# Patient Record
Sex: FEMALE | Race: WHITE | NOT HISPANIC OR LATINO | URBAN - METROPOLITAN AREA
[De-identification: names, ages, dates, MRNs, and addresses within clinical notes are randomized per-mention and may not be internally consistent; named-entity substitution may affect disease eponyms.]

---

## 2022-02-10 ENCOUNTER — PRE-PROCEDURE SCREENING (OUTPATIENT)
Dept: GASTROENTEROLOGY | Facility: CLINIC | Age: 57
End: 2022-02-10

## 2022-02-15 NOTE — TELEPHONE ENCOUNTER
Pt is calling about a missed call from the other day pt says she doesn't have anything that shoes that she is receiving a call. She states that the only one she has gotten was the one from feb 10th. But I see record of the missed calls

## 2022-03-06 ENCOUNTER — PREP FOR SURGERY (OUTPATIENT)
Dept: OTHER | Facility: HOSPITAL | Age: 57
End: 2022-03-06

## 2022-03-06 DIAGNOSIS — Z80.0 FH: COLON CANCER: ICD-10-CM

## 2022-03-06 DIAGNOSIS — K63.5 COLON POLYP: Primary | ICD-10-CM

## 2022-03-29 ENCOUNTER — TELEPHONE (OUTPATIENT)
Dept: GASTROENTEROLOGY | Facility: CLINIC | Age: 57
End: 2022-03-29

## 2022-03-29 PROBLEM — Z80.0 FH: COLON CANCER: Status: ACTIVE | Noted: 2022-03-29

## 2022-03-29 PROBLEM — K63.5 COLON POLYP: Status: ACTIVE | Noted: 2022-03-29

## 2022-06-14 ENCOUNTER — APPOINTMENT (OUTPATIENT)
Dept: WOMENS IMAGING | Facility: HOSPITAL | Age: 57
End: 2022-06-14

## 2022-06-14 PROCEDURE — 77067 SCR MAMMO BI INCL CAD: CPT | Performed by: RADIOLOGY

## 2022-06-14 PROCEDURE — 77063 BREAST TOMOSYNTHESIS BI: CPT | Performed by: RADIOLOGY

## 2022-07-12 ENCOUNTER — TELEPHONE (OUTPATIENT)
Dept: GASTROENTEROLOGY | Facility: CLINIC | Age: 57
End: 2022-07-12

## 2022-07-12 NOTE — TELEPHONE ENCOUNTER
Caller: Brenda Boyle    Relationship to patient: Self    Best call back number: [phone]    Patient is needing: PATIENT IS IN NEED OF HAVING THE COLONOSCOPY COMPLETED ON 7/15/22. PATIENT RECEIVED VOICEMAIL THAT DR HOPKINS WILL NOT BE ABLE TO PERFORM THIS AND SHE HAS OTHER APPOINTMENTS SCHEDULE AROUND THIS NEED.      PLEASE CALL PATIENT TO DISCUSS OPTIONS.

## 2022-07-13 NOTE — TELEPHONE ENCOUNTER
Tried calling pt but had to leave a message. [facility] is not doing scopes on 7/15/22. Pt needs to be rescheduled.

## 2022-07-19 ENCOUNTER — APPOINTMENT (OUTPATIENT)
Dept: WOMENS IMAGING | Facility: HOSPITAL | Age: 57
End: 2022-07-19

## 2022-08-31 ENCOUNTER — TELEPHONE (OUTPATIENT)
Dept: GASTROENTEROLOGY | Facility: CLINIC | Age: 57
End: 2022-08-31

## 2022-08-31 NOTE — TELEPHONE ENCOUNTER
Caller: Brenda Boyle    Relationship to patient: Self    Best call back number: [phone]    Type of visit: COLONOSCOPY    Requested date: PATIENT WILL CALL BACK TO RESCHEDULE    If rescheduling, when is the original appointment: 9/30/22    Additional notes: CANCEL 9/30 COLONOSCOPY